# Patient Record
Sex: MALE | Race: WHITE | NOT HISPANIC OR LATINO | Employment: OTHER | URBAN - NONMETROPOLITAN AREA
[De-identification: names, ages, dates, MRNs, and addresses within clinical notes are randomized per-mention and may not be internally consistent; named-entity substitution may affect disease eponyms.]

---

## 2017-05-22 ENCOUNTER — OFFICE VISIT (OUTPATIENT)
Dept: CARDIOLOGY | Facility: CLINIC | Age: 67
End: 2017-05-22

## 2017-05-22 VITALS
DIASTOLIC BLOOD PRESSURE: 88 MMHG | BODY MASS INDEX: 26.51 KG/M2 | HEIGHT: 69 IN | HEART RATE: 60 BPM | SYSTOLIC BLOOD PRESSURE: 136 MMHG | WEIGHT: 179 LBS

## 2017-05-22 DIAGNOSIS — I10 ESSENTIAL HYPERTENSION: ICD-10-CM

## 2017-05-22 DIAGNOSIS — R00.2 PALPITATIONS: ICD-10-CM

## 2017-05-22 DIAGNOSIS — E78.2 MIXED HYPERLIPIDEMIA: ICD-10-CM

## 2017-05-22 DIAGNOSIS — I25.10 CORONARY ARTERY DISEASE INVOLVING NATIVE CORONARY ARTERY OF NATIVE HEART WITHOUT ANGINA PECTORIS: Primary | ICD-10-CM

## 2017-05-22 PROCEDURE — 99214 OFFICE O/P EST MOD 30 MIN: CPT | Performed by: INTERNAL MEDICINE

## 2017-05-22 RX ORDER — NITROGLYCERIN 400 UG/1
1 SPRAY ORAL
COMMUNITY

## 2017-05-22 RX ORDER — LOSARTAN POTASSIUM 50 MG/1
50 TABLET ORAL DAILY
COMMUNITY
End: 2019-05-29

## 2017-05-22 RX ORDER — PANTOPRAZOLE SODIUM 40 MG/1
40 TABLET, DELAYED RELEASE ORAL DAILY
COMMUNITY
End: 2019-05-29

## 2017-05-22 RX ORDER — CARVEDILOL 6.25 MG/1
6.25 TABLET ORAL 2 TIMES DAILY WITH MEALS
COMMUNITY
End: 2019-05-29

## 2017-05-22 RX ORDER — FERROUS SULFATE 325(65) MG
325 TABLET ORAL
COMMUNITY
End: 2019-05-29

## 2017-05-22 RX ORDER — LOPERAMIDE HYDROCHLORIDE 2 MG/1
2 CAPSULE ORAL AS NEEDED
COMMUNITY
End: 2019-05-29

## 2017-05-22 RX ORDER — ASPIRIN 325 MG
325 TABLET ORAL DAILY
COMMUNITY
End: 2019-05-29

## 2017-05-22 RX ORDER — SIMVASTATIN 40 MG
40 TABLET ORAL NIGHTLY
COMMUNITY
End: 2019-05-29

## 2018-05-23 NOTE — PROGRESS NOTES
[facility]  Office Progress Note  Tyree Mcneil  1950  [phone]  [phone]    Visit Date: 5/24/2018     PCP: Jr Altamirano MD  [address] [facility] [address]    IDENTIFICATION: A 67 y.o. male retired, from [place], [place].    Chief Complaint   Patient presents with   • Coronary Artery Disease       PROBLEM LIST:   1. Coronary artery disease:  a. March 2004, GLO to LAD.  b. December 2005, multivessel coronary disease with CABG x4 per Sachin Camarena MD; LIMA to LAD; saphenous graft to sequential OM1 and posterolateral RCA; saphenous graft to PDA. Normal LVEF.  c. November 2010, MPS per [facility]: Normal perfusion, EF preserved.   d. Spring 2011, non-ST MI at [place], perioperative during Ileostomy.  e. 5/16/16 echo: EF 55-60%, mild MR, mild TR, RVSP 20 mmHg  2. Pulmonary embolus:  a. Discontinuation of Coumadin one year after initiation in 2012.   3. Hypertension.   4. Dyslipidemia:   a. May 2015: Total 118, triglyceride 81, HDL 39, LDL 63.   5. Reformed nicotine addiction.   6. Anxiety.   7. Syncope.   8. Gastroesophageal reflux disease/history of hiatal hernia.  9. Right inguinal hernia.  10. Lower extremity neuropathy following chemotherapy.   11. Acute renal failure.  12. Sepsis.  13. A 50-pound weight loss.  14. Colorectal cancer, patient received chemo and radiation:  a. Colon resection, December 2010.   b. January 2011, reversal of ileostomy with notation of extensive adhesions.   c. Patient refuses further colorectal evaluation at time of ileostomy.     Allergies  Allergies   Allergen Reactions   • Morphine And Related Anaphylaxis   • Other      Ace inhibitors         Current Medications    Current Outpatient Prescriptions:   •  aspirin 325 MG tablet, Take 325 mg by mouth Daily., Disp: , Rfl:   •  carvedilol (COREG) 6.25 MG tablet, Take 6.25 mg by mouth 2 (Two) Times a Day With Meals., Disp: , Rfl:   •  Cyanocobalamin (VITAMIN B 12 PO), Take 1,000 mcg by  "mouth., Disp: , Rfl:   •  ferrous sulfate 325 (65 FE) MG tablet, Take 325 mg by mouth Daily With Breakfast., Disp: , Rfl:   •  loperamide (IMODIUM) 2 MG capsule, Take 2 mg by mouth As Needed for Diarrhea., Disp: , Rfl:   •  losartan (COZAAR) 50 MG tablet, Take 50 mg by mouth Daily., Disp: , Rfl:   •  nitroglycerin (NITROLINGUAL) 0.4 MG/SPRAY spray, Place 1 spray under the tongue Every 5 (Five) Minutes As Needed for Chest Pain., Disp: , Rfl:   •  pantoprazole (PROTONIX) 40 MG EC tablet, Take 40 mg by mouth Daily., Disp: , Rfl:   •  simvastatin (ZOCOR) 40 MG tablet, Take 40 mg by mouth Every Night., Disp: , Rfl:       History of Present Illness     Pt denies any chest pain, dyspnea, dyspnea on exertion, orthopnea, PND,   lower extremity edema, or claudication.  Stopped all medications over the last year w melena.  Does not want to fu w GI.  Labs earlier this month essentially normal w LDL > 100 however.    ROS:  All systems have been reviewed and are negative with the exception of those mentioned in the HPI.    OBJECTIVE:  Vitals:    05/24/18 0928   BP: 120/89   BP Location: Right arm   Patient Position: Sitting   Pulse: 54   Weight: 79.8 kg (176 lb)   Height: 175.3 cm (69\")     Physical Exam   Constitutional: He appears well-developed and well-nourished.   Neck: Normal range of motion. Neck supple. No hepatojugular reflux and no JVD present. Carotid bruit is not present. No tracheal deviation present. No thyromegaly present.   Cardiovascular: Normal rate, regular rhythm, S1 normal, S2 normal, intact distal pulses and normal pulses.   Extrasystoles are present. PMI is not displaced.  Exam reveals no gallop, no distant heart sounds, no friction rub, no midsystolic click and no opening snap.    No murmur heard.  Pulses:       Radial pulses are 2+ on the right side, and 2+ on the left side.        Dorsalis pedis pulses are 2+ on the right side, and 2+ on the left side.        Posterior tibial pulses are 2+ on the right " side, and 2+ on the left side.   Pulmonary/Chest: Effort normal and breath sounds normal. He has no wheezes. He has no rales.   Abdominal: Soft. Bowel sounds are normal. He exhibits no mass. There is no tenderness. There is no guarding.       Diagnostic Data:  Procedures      ASSESSMENT:  No diagnosis found.    PLAN:  1. Coronary artery disease with remote arterial bypass grafting with no new anginal equivalents: Restart minimal Rx if pt agrees asa 81, coreg 3.2125 bid, simva 20  2. Hypertension: Patient does not monitor his blood pressures at home and did not take his blood pressure medications today; therefore, his blood pressure is not elevated in the office today.  3.  Dyslipidemia: restart simva  4. Discussed at length w pt and daughter , he should fu w GI.  He defers    Jr Altamirano MD, thank you for referring Mr. Mcneil for evaluation.  I have forwarded my electronically generated recommendations to you for review.  Please do not hesitate to call with any questions.    Scribed for Reggie Garcia MD by Gini Santacruz PA-C. 5/24/2018  9:56 AM  I, Reggie Garcia MD, personally performed the services described in this documentation as scribed by the above named individual in my presence, and it is both accurate and complete.  5/24/2018  9:59 AM    Reggie Garcia MD, [facility]

## 2018-05-24 ENCOUNTER — OFFICE VISIT (OUTPATIENT)
Dept: CARDIOLOGY | Facility: CLINIC | Age: 68
End: 2018-05-24

## 2018-05-24 VITALS
SYSTOLIC BLOOD PRESSURE: 120 MMHG | HEART RATE: 54 BPM | DIASTOLIC BLOOD PRESSURE: 89 MMHG | WEIGHT: 176 LBS | HEIGHT: 69 IN | BODY MASS INDEX: 26.07 KG/M2

## 2018-05-24 DIAGNOSIS — E78.2 MIXED HYPERLIPIDEMIA: ICD-10-CM

## 2018-05-24 DIAGNOSIS — I25.10 CORONARY ARTERY DISEASE INVOLVING NATIVE CORONARY ARTERY OF NATIVE HEART WITHOUT ANGINA PECTORIS: Primary | ICD-10-CM

## 2018-05-24 DIAGNOSIS — I10 ESSENTIAL HYPERTENSION: ICD-10-CM

## 2018-05-24 PROCEDURE — 99214 OFFICE O/P EST MOD 30 MIN: CPT | Performed by: INTERNAL MEDICINE

## 2019-05-07 ENCOUNTER — TRANSCRIBE ORDERS (OUTPATIENT)
Dept: ADMINISTRATIVE | Facility: HOSPITAL | Age: 69
End: 2019-05-07

## 2019-05-07 DIAGNOSIS — I65.23 BILATERAL CAROTID ARTERY STENOSIS: Primary | ICD-10-CM

## 2019-05-29 ENCOUNTER — OFFICE VISIT (OUTPATIENT)
Dept: CARDIOLOGY | Facility: CLINIC | Age: 69
End: 2019-05-29

## 2019-05-29 VITALS
BODY MASS INDEX: 24.65 KG/M2 | WEIGHT: 166.4 LBS | SYSTOLIC BLOOD PRESSURE: 140 MMHG | HEIGHT: 69 IN | HEART RATE: 64 BPM | DIASTOLIC BLOOD PRESSURE: 80 MMHG

## 2019-05-29 DIAGNOSIS — E78.2 MIXED HYPERLIPIDEMIA: ICD-10-CM

## 2019-05-29 DIAGNOSIS — I10 ESSENTIAL HYPERTENSION: ICD-10-CM

## 2019-05-29 DIAGNOSIS — I25.10 CORONARY ARTERY DISEASE INVOLVING NATIVE CORONARY ARTERY OF NATIVE HEART WITHOUT ANGINA PECTORIS: Primary | ICD-10-CM

## 2019-05-29 PROCEDURE — 99214 OFFICE O/P EST MOD 30 MIN: CPT | Performed by: INTERNAL MEDICINE

## 2019-05-29 NOTE — PROGRESS NOTES
[facility]  Office Progress Note  Tyree Mcneil  1950  [phone]  [phone]    Visit Date: 5/29/2019     PCP: Jr Altamirano MD  [address] [facility] [address]    IDENTIFICATION: A 68 y.o. male retired, from [place], [place].    Chief Complaint   Patient presents with   • Coronary Artery Disease       PROBLEM LIST:   1. Coronary artery disease:  a. March 2004, GLO to LAD.  b. December 2005, multivessel coronary disease with CABG x4 per Sachin Camarena MD; LIMA to LAD; saphenous graft to sequential OM1 and posterolateral RCA; saphenous graft to PDA. Normal LVEF.  c. November 2010, MPS per [facility]: Normal perfusion, EF preserved.   d. Spring 2011, non-ST MI at [place], perioperative during Ileostomy.  e. 5/16/16 echo: EF 55-60%, mild MR, mild TR, RVSP 20 mmHg  2. Pulmonary embolus:  a. Discontinuation of Coumadin one year after initiation in 2012.   3. Hypertension.   4. Dyslipidemia:   a. May 2015: Total 118, triglyceride 81, HDL 39, LDL 63.   b. 5/8/19:  TG 86 HDL 36   5. Reformed nicotine addiction.   6. Anxiety.   7. Syncope.   8. Gastroesophageal reflux disease/history of hiatal hernia.  9. Right inguinal hernia.  10. Lower extremity neuropathy following chemotherapy.   11. Acute renal failure.  12. Sepsis.  13. A 50-pound weight loss.  14. Colorectal cancer, patient received chemo and radiation:  a. Colon resection, December 2010.   b. January 2011, reversal of ileostomy with notation of extensive adhesions.   c. Patient refuses further colorectal evaluation at time of ileostomy.     Allergies  Allergies   Allergen Reactions   • Morphine And Related Anaphylaxis   • Other      Ace inhibitors         Current Medications    Current Outpatient Medications:   •  nitroglycerin (NITROLINGUAL) 0.4 MG/SPRAY spray, Place 1 spray under the tongue Every 5 (Five) Minutes As Needed for Chest Pain., Disp: , Rfl:       History of Present Illness     Pt denies any  "chest pain, dyspnea, dyspnea on exertion, orthopnea, PND,   lower extremity edema, or claudication.  Stopped all medications again  over the last year w recurrent melena after round of antibiotics for tooth extraction.  Does not want to fu w GI.        OBJECTIVE:  Vitals:    05/29/19 0857   BP: 140/80   BP Location: Right arm   Patient Position: Sitting   Pulse: 64   Weight: 75.5 kg (166 lb 6.4 oz)   Height: 175.3 cm (69\")     Physical Exam   Constitutional: He appears well-developed and well-nourished.   Neck: Normal range of motion. Neck supple. No hepatojugular reflux and no JVD present. Carotid bruit is not present. No tracheal deviation present. No thyromegaly present.   Cardiovascular: Normal rate, regular rhythm, S1 normal, S2 normal, intact distal pulses and normal pulses.  Extrasystoles are present. PMI is not displaced. Exam reveals no gallop, no distant heart sounds, no friction rub, no midsystolic click and no opening snap.   No murmur heard.  Pulses:       Radial pulses are 2+ on the right side, and 2+ on the left side.        Dorsalis pedis pulses are 2+ on the right side, and 2+ on the left side.        Posterior tibial pulses are 2+ on the right side, and 2+ on the left side.   Pulmonary/Chest: Effort normal and breath sounds normal. He has no wheezes. He has no rales.   Abdominal: Soft. Bowel sounds are normal. He exhibits no mass. There is no tenderness. There is no guarding.       Diagnostic Data:  Procedures      ASSESSMENT:   Diagnosis Plan   1. Coronary artery disease involving native coronary artery of native heart without angina pectoris     2. Essential hypertension     3. Mixed hyperlipidemia         PLAN:  1. Coronary artery disease with remote arterial bypass grafting with no new anginal equivalents: Pt defers Rx.  2. Hypertension: Patient does not monitor his blood pressures at home and does not take his blood pressure medications   3.  Dyslipidemia: differs statin    Not sure what role " I can offer at current as pt does not wish to continue any preventative treatments.      Jr Altamirano MD, thank you for referring Mr. Mcneil for evaluation.  I have forwarded my electronically generated recommendations to you for review.  Please do not hesitate to call with any questions.     Reggie Garcia MD, FACC

## 2019-08-08 ENCOUNTER — TRANSCRIBE ORDERS (OUTPATIENT)
Dept: ADMINISTRATIVE | Facility: HOSPITAL | Age: 69
End: 2019-08-08

## 2019-08-08 ENCOUNTER — HOSPITAL ENCOUNTER (OUTPATIENT)
Dept: GENERAL RADIOLOGY | Facility: HOSPITAL | Age: 69
Discharge: HOME | End: 2019-08-08
Admitting: INTERNAL MEDICINE

## 2019-08-08 DIAGNOSIS — R07.81 PLEURITIC CHEST PAIN: Primary | ICD-10-CM

## 2019-08-08 DIAGNOSIS — R07.81 PLEURITIC CHEST PAIN: ICD-10-CM

## 2019-08-08 PROCEDURE — 71046 X-RAY EXAM CHEST 2 VIEWS: CPT

## 2019-08-08 PROCEDURE — 71046 X-RAY EXAM CHEST 2 VIEWS: CPT | Performed by: RADIOLOGY

## 2020-08-24 ENCOUNTER — OFFICE VISIT (OUTPATIENT)
Dept: CARDIOLOGY | Facility: CLINIC | Age: 70
End: 2020-08-24

## 2020-08-24 VITALS
OXYGEN SATURATION: 97 % | SYSTOLIC BLOOD PRESSURE: 122 MMHG | DIASTOLIC BLOOD PRESSURE: 74 MMHG | HEART RATE: 52 BPM | WEIGHT: 155 LBS | BODY MASS INDEX: 22.96 KG/M2 | HEIGHT: 69 IN

## 2020-08-24 DIAGNOSIS — E78.2 MIXED HYPERLIPIDEMIA: ICD-10-CM

## 2020-08-24 DIAGNOSIS — I25.10 CORONARY ARTERY DISEASE INVOLVING NATIVE CORONARY ARTERY OF NATIVE HEART WITHOUT ANGINA PECTORIS: Primary | ICD-10-CM

## 2020-08-24 DIAGNOSIS — I10 ESSENTIAL HYPERTENSION: ICD-10-CM

## 2020-08-24 PROCEDURE — 99213 OFFICE O/P EST LOW 20 MIN: CPT | Performed by: INTERNAL MEDICINE

## 2020-08-24 PROCEDURE — 93000 ELECTROCARDIOGRAM COMPLETE: CPT | Performed by: INTERNAL MEDICINE

## 2020-08-24 NOTE — PROGRESS NOTES
[facility]  Office Progress Note  Tyree Mcneil  1950  [phone]  [phone]    Visit Date: 8/24/2020     PCP: Jr Altamirano MD  [address] [facility] [address]    IDENTIFICATION: A 69 y.o. male retired, from [place], [place].    Chief Complaint   Patient presents with   • Coronary Artery Disease       PROBLEM LIST:   1. Coronary artery disease:  a. March 2004, GLO to LAD.  b. December 2005, multivessel coronary disease with CABG x4 per Sachin Camarena MD; LIMA to LAD; saphenous graft to sequential OM1 and posterolateral RCA; saphenous graft to PDA. Normal LVEF.  c. November 2010, MPS per [facility]: Normal perfusion, EF preserved.   d. Spring 2011, non-ST MI at [place], perioperative during Ileostomy.  e. 5/16/16 echo: EF 55-60%, mild MR, mild TR, RVSP 20 mmHg  2. Pulmonary embolus:  a. Discontinuation of Coumadin one year after initiation in 2012.   3. Hypertension.   4. Dyslipidemia:   a. May 2015: Total 118, triglyceride 81, HDL 39, LDL 63.   b. 5/8/19:  TG 86 HDL 36   5. Reformed nicotine addiction.   6. Anxiety.   7. Syncope.   8. Gastroesophageal reflux disease/history of hiatal hernia.  9. Right inguinal hernia.  10. Lower extremity neuropathy following chemotherapy.   11. Acute renal failure.  12. Sepsis.  13. A 50-pound weight loss.  14. Colorectal cancer, patient received chemo and radiation:  a. Colon resection, December 2010.   b. January 2011, reversal of ileostomy with notation of extensive adhesions.   c. Patient refuses further colorectal evaluation at time of ileostomy.     Allergies  Allergies   Allergen Reactions   • Morphine And Related Anaphylaxis   • Other      Ace inhibitors         Current Medications    Current Outpatient Medications:   •  nitroglycerin (NITROLINGUAL) 0.4 MG/SPRAY spray, Place 1 spray under the tongue Every 5 (Five) Minutes As Needed for Chest Pain., Disp: , Rfl:       History of Present Illness     Pt denies any  "chest pain, dyspnea, dyspnea on exertion, orthopnea, PND,   lower extremity edema, or claudication.  No recent serologies continues yearly follow-up with Dr. Altamirano.  He has had decreased activities this summer due to inclement heat.      OBJECTIVE:  Vitals:    08/24/20 1247   BP: 122/74   BP Location: Right arm   Patient Position: Sitting   Pulse: 52   SpO2: 97%   Weight: 70.3 kg (155 lb)   Height: 175.3 cm (69\")     Physical Exam   Constitutional: He appears well-developed and well-nourished.   Neck: Normal range of motion. Neck supple. No hepatojugular reflux and no JVD present. Carotid bruit is not present. No tracheal deviation present. No thyromegaly present.   Cardiovascular: Normal rate, regular rhythm, S1 normal, S2 normal, intact distal pulses and normal pulses.  Extrasystoles are present. PMI is not displaced. Exam reveals no gallop, no distant heart sounds, no friction rub, no midsystolic click and no opening snap.   No murmur heard.  Pulses:       Radial pulses are 2+ on the right side, and 2+ on the left side.        Dorsalis pedis pulses are 2+ on the right side, and 2+ on the left side.        Posterior tibial pulses are 2+ on the right side, and 2+ on the left side.   Pulmonary/Chest: Effort normal and breath sounds normal. He has no wheezes. He has no rales.   Abdominal: Soft. Bowel sounds are normal. He exhibits no mass. There is no tenderness. There is no guarding.       Diagnostic Data:    ECG 12 Lead  Date/Time: 8/24/2020 1:54 PM  Performed by: Reggie Garcia MD  Authorized by: Reggie Garcia MD   Previous ECG: no previous ECG available  Rhythm: sinus bradycardia  BPM: 52    Clinical impression: non-specific ECG              ASSESSMENT:   Diagnosis Plan   1. Coronary artery disease involving native coronary artery of native heart without angina pectoris     2. Essential hypertension     3. Mixed hyperlipidemia         PLAN:  1. Coronary artery disease with remote arterial bypass grafting " with no new anginal equivalents: Pt defers Rx.  2. Hypertension: Patient does not monitor his blood pressures at home and does not take his blood pressure medications   3.  Dyslipidemia: differs statin    Not sure what role I can offer at current as pt does not wish to continue any preventative treatments.      Jr Altamirano MD, thank you for referring Mr. Mcneil for evaluation.  I have forwarded my electronically generated recommendations to you for review.  Please do not hesitate to call with any questions.     Reggie Garcia MD, FACC

## 2021-02-17 DIAGNOSIS — Z23 IMMUNIZATION DUE: ICD-10-CM

## 2021-08-30 ENCOUNTER — OFFICE VISIT (OUTPATIENT)
Dept: CARDIOLOGY | Facility: CLINIC | Age: 71
End: 2021-08-30

## 2021-08-30 VITALS
HEART RATE: 62 BPM | BODY MASS INDEX: 24.64 KG/M2 | WEIGHT: 157 LBS | SYSTOLIC BLOOD PRESSURE: 130 MMHG | OXYGEN SATURATION: 98 % | DIASTOLIC BLOOD PRESSURE: 74 MMHG | HEIGHT: 67 IN

## 2021-08-30 DIAGNOSIS — E78.2 MIXED HYPERLIPIDEMIA: ICD-10-CM

## 2021-08-30 DIAGNOSIS — I25.10 CORONARY ARTERY DISEASE INVOLVING NATIVE CORONARY ARTERY OF NATIVE HEART WITHOUT ANGINA PECTORIS: Primary | ICD-10-CM

## 2021-08-30 PROCEDURE — 99213 OFFICE O/P EST LOW 20 MIN: CPT | Performed by: INTERNAL MEDICINE

## 2021-08-30 PROCEDURE — 93000 ELECTROCARDIOGRAM COMPLETE: CPT | Performed by: INTERNAL MEDICINE

## 2021-08-30 NOTE — PROGRESS NOTES
[facility]  Office Progress Note  Tyree Mcneil  1950  [address] [place] [address]       Visit Date: 08/30/21    PCP: Jr Altamirano MD  [address] [facility] [address]    IDENTIFICATION: A 71 y.o. male retired, from [place], [place].    PROBLEM LIST:   1. Coronary artery disease:  a. March 2004, GLO to LAD.  b. December 2005, multivessel coronary disease with CABG x4 per Sachin Camarena MD; LIMA to LAD; saphenous graft to sequential OM1 and posterolateral RCA; saphenous graft to PDA. Normal LVEF.  c. November 2010, MPS per [facility]: Normal perfusion, EF preserved.   d. Spring 2011, non-ST MI at [place], perioperative during Ileostomy.  e. 5/16/16 echo: EF 55-60%, mild MR, mild TR, RVSP 20 mmHg  2. Pulmonary embolus:  a. Discontinuation of Coumadin one year after initiation in 2012.   3. Hypertension.   4. Dyslipidemia:   a. May 2015: Total 118, triglyceride 81, HDL 39, LDL 63.   b. 5/8/19:  TG 86 HDL 36   5. Reformed nicotine addiction.   6. Anxiety.   7. Syncope.   8. Gastroesophageal reflux disease/history of hiatal hernia.  9. Right inguinal hernia.  10. Lower extremity neuropathy following chemotherapy.   11. Acute renal failure.  12. Sepsis.  13. A 50-pound weight loss.  14. Colorectal cancer, patient received chemo and radiation:  a. Colon resection, December 2010.   b. January 2011, reversal of ileostomy with notation of extensive adhesions.   c. Patient refuses further colorectal evaluation at time of ileostomy.      CC:   Chief Complaint   Patient presents with   • Coronary Artery Disease       Allergies  Allergies   Allergen Reactions   • Morphine And Related Anaphylaxis   • Other      Ace inhibitors         Current Medications    Current Outpatient Medications:   •  nitroglycerin (NITROLINGUAL) 0.4 MG/SPRAY spray, Place 1 spray under the tongue Every 5 (Five) Minutes As Needed for Chest Pain., Disp: , Rfl:       History of Present Illness   Tyree Mcneil  "is a 71 y.o. year old male here for follow up.    Pt denies any chest pain, dyspnea, dyspnea on exertion, orthopnea, PND, palpitations, lower extremity edema, or claudication.  Continues off medication per his chart      OBJECTIVE:  Vitals:    08/30/21 1033   BP: 130/74   BP Location: Right arm   Patient Position: Sitting   Pulse: 62   SpO2: 98%   Weight: 71.2 kg (157 lb)   Height: 168.9 cm (66.5\")     Body mass index is 24.96 kg/m².    Constitutional:       Appearance: Healthy appearance. Not in distress.   Neck:      Vascular: No JVR. JVD normal.   Pulmonary:      Effort: Pulmonary effort is normal.      Breath sounds: Normal breath sounds. No wheezing. No rhonchi. No rales.   Chest:      Chest wall: Not tender to palpatation.   Cardiovascular:      PMI at left midclavicular line. Normal rate. Regular rhythm. Normal S1. Normal S2.      Murmurs: There is no murmur.      No gallop. No click. No rub.   Pulses:     Intact distal pulses.   Edema:     Peripheral edema absent.   Abdominal:      General: Bowel sounds are normal.      Palpations: Abdomen is soft.      Tenderness: There is no abdominal tenderness.   Musculoskeletal: Normal range of motion.         General: No tenderness. Skin:     General: Skin is warm and dry.   Neurological:      General: No focal deficit present.      Mental Status: Alert and oriented to person, place and time.         Diagnostic Data:    ECG 12 Lead    Date/Time: 8/30/2021 10:52 AM  Performed by: Reggie Garcia MD  Authorized by: Reggie Garcia MD   Previous ECG: no previous ECG available  Rhythm: sinus bradycardia  BPM: 56    Clinical impression: normal ECG              ASSESSMENT:   Diagnosis Plan   1. Coronary artery disease involving native coronary artery of native heart without angina pectoris     2. Mixed hyperlipidemia         PLAN:  CAD post remote surgical vascularization patient elects no medication    Dyslipidemia unknown current status and again patient elects no " medication          Reggie Garcia MD, [facility]

## 2022-01-19 ENCOUNTER — TELEPHONE (OUTPATIENT)
Dept: CARDIOLOGY | Facility: CLINIC | Age: 72
End: 2022-01-19

## 2022-01-19 DIAGNOSIS — I25.10 CORONARY ARTERY DISEASE INVOLVING NATIVE CORONARY ARTERY OF NATIVE HEART WITHOUT ANGINA PECTORIS: Primary | ICD-10-CM

## 2022-01-19 DIAGNOSIS — Z01.818 PREOPERATIVE CLEARANCE: ICD-10-CM

## 2022-01-19 NOTE — TELEPHONE ENCOUNTER
Pt called needing cc for inguinal hernia repair planned with Aris Peña MD  Last seen by NIK 8/2021, takes no medications. History of perioperative MI, Spring 2011    Wife states pt does not complain of chest pain or shortness of breath. She reports he is very active. She is aware that pt is at high risk due to known heart disease, with history of perioperative MI, and his declination of medical therapy.     Spoke with Dr. Peña's office to obtain surgery info.   Large left inguinal hernia, needs repair. Dr Peña has discussed with pt that DVT prophylaxis will be needed.    Fax [phone]    See in office? Offer stress test?     Per NIK Vyas in [place], wife aware and agreeable.

## 2022-01-31 ENCOUNTER — HOSPITAL ENCOUNTER (OUTPATIENT)
Dept: CARDIOLOGY | Facility: HOSPITAL | Age: 72
Discharge: HOME | End: 2022-01-31
Admitting: INTERNAL MEDICINE

## 2022-01-31 VITALS
BODY MASS INDEX: 25.23 KG/M2 | DIASTOLIC BLOOD PRESSURE: 79 MMHG | SYSTOLIC BLOOD PRESSURE: 143 MMHG | HEART RATE: 60 BPM | WEIGHT: 157 LBS | HEIGHT: 66 IN

## 2022-01-31 DIAGNOSIS — I25.10 CORONARY ARTERY DISEASE INVOLVING NATIVE CORONARY ARTERY OF NATIVE HEART WITHOUT ANGINA PECTORIS: ICD-10-CM

## 2022-01-31 DIAGNOSIS — Z01.818 PREOPERATIVE CLEARANCE: ICD-10-CM

## 2022-01-31 PROCEDURE — A9500 TC99M SESTAMIBI: HCPCS | Performed by: INTERNAL MEDICINE

## 2022-01-31 PROCEDURE — 93017 CV STRESS TEST TRACING ONLY: CPT

## 2022-01-31 PROCEDURE — 78452 HT MUSCLE IMAGE SPECT MULT: CPT

## 2022-01-31 PROCEDURE — 78452 HT MUSCLE IMAGE SPECT MULT: CPT | Performed by: INTERNAL MEDICINE

## 2022-01-31 PROCEDURE — 93018 CV STRESS TEST I&R ONLY: CPT | Performed by: INTERNAL MEDICINE

## 2022-01-31 PROCEDURE — 25010000002 REGADENOSON 0.4 MG/5ML SOLUTION: Performed by: INTERNAL MEDICINE

## 2022-01-31 PROCEDURE — 0 TECHNETIUM SESTAMIBI: Performed by: INTERNAL MEDICINE

## 2022-01-31 RX ADMIN — REGADENOSON 0.4 MG: 0.08 INJECTION, SOLUTION INTRAVENOUS at 09:46

## 2022-01-31 RX ADMIN — TECHNETIUM TC 99M SESTAMIBI 1 DOSE: 1 INJECTION INTRAVENOUS at 09:45

## 2022-01-31 RX ADMIN — TECHNETIUM TC 99M SESTAMIBI 1 DOSE: 1 INJECTION INTRAVENOUS at 08:05

## 2022-02-01 ENCOUNTER — TELEPHONE (OUTPATIENT)
Dept: CARDIOLOGY | Facility: CLINIC | Age: 72
End: 2022-02-01

## 2022-02-01 LAB
BH CV REST NUCLEAR ISOTOPE DOSE: 9.6 MCI
BH CV STRESS BP STAGE 2: NORMAL
BH CV STRESS BP STAGE 4: NORMAL
BH CV STRESS COMMENTS STAGE 1: NORMAL
BH CV STRESS DOSE REGADENOSON STAGE 1: 0.4
BH CV STRESS DURATION MIN STAGE 1: 1
BH CV STRESS DURATION MIN STAGE 2: 1
BH CV STRESS DURATION MIN STAGE 3: 1
BH CV STRESS DURATION MIN STAGE 4: 1
BH CV STRESS HR STAGE 1: 75
BH CV STRESS HR STAGE 2: 109
BH CV STRESS HR STAGE 3: 87
BH CV STRESS HR STAGE 4: 85
BH CV STRESS NUCLEAR ISOTOPE DOSE: 33 MCI
BH CV STRESS O2 STAGE 1: 91
BH CV STRESS O2 STAGE 2: 96
BH CV STRESS O2 STAGE 3: 97
BH CV STRESS O2 STAGE 4: 97
BH CV STRESS PROTOCOL 1: NORMAL
BH CV STRESS RECOVERY BP: NORMAL MMHG
BH CV STRESS RECOVERY HR: 82 BPM
BH CV STRESS RECOVERY O2: 97 %
BH CV STRESS STAGE 1: 1
BH CV STRESS STAGE 2: 2
BH CV STRESS STAGE 3: 3
BH CV STRESS STAGE 4: 4
LV EF NUC BP: 62 %
MAXIMAL PREDICTED HEART RATE: 149 BPM
PERCENT MAX PREDICTED HR: 75.17 %
STRESS BASELINE BP: NORMAL MMHG
STRESS BASELINE HR: 60 BPM
STRESS O2 SAT REST: 91 %
STRESS PERCENT HR: 88 %
STRESS POST ESTIMATED WORKLOAD: 1 METS
STRESS POST EXERCISE DUR MIN: 4 MIN
STRESS POST EXERCISE DUR SEC: 0 SEC
STRESS POST O2 SAT PEAK: 97 %
STRESS POST PEAK BP: NORMAL MMHG
STRESS POST PEAK HR: 112 BPM
STRESS TARGET HR: 127 BPM

## 2022-02-01 NOTE — TELEPHONE ENCOUNTER
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 ePña.

## 2022-03-07 ENCOUNTER — PRE-ADMISSION TESTING (OUTPATIENT)
Dept: PREADMISSION TESTING | Facility: HOSPITAL | Age: 72
End: 2022-03-07

## 2022-03-07 LAB
ANION GAP SERPL CALCULATED.3IONS-SCNC: 8 MMOL/L (ref 5–15)
BUN SERPL-MCNC: 21 MG/DL (ref 8–23)
BUN/CREAT SERPL: 19.8 (ref 7–25)
CALCIUM SPEC-SCNC: 9.6 MG/DL (ref 8.6–10.5)
CHLORIDE SERPL-SCNC: 106 MMOL/L (ref 98–107)
CO2 SERPL-SCNC: 27 MMOL/L (ref 22–29)
CREAT SERPL-MCNC: 1.06 MG/DL (ref 0.76–1.27)
DEPRECATED RDW RBC AUTO: 39.2 FL (ref 37–54)
EGFRCR SERPLBLD CKD-EPI 2021: 75 ML/MIN/1.73
ERYTHROCYTE [DISTWIDTH] IN BLOOD BY AUTOMATED COUNT: 12.3 % (ref 12.3–15.4)
GLUCOSE SERPL-MCNC: 93 MG/DL (ref 65–99)
HCT VFR BLD AUTO: 39.5 % (ref 37.5–51)
HGB BLD-MCNC: 13.2 G/DL (ref 13–17.7)
MCH RBC QN AUTO: 29.3 PG (ref 26.6–33)
MCHC RBC AUTO-ENTMCNC: 33.4 G/DL (ref 31.5–35.7)
MCV RBC AUTO: 87.8 FL (ref 79–97)
PLATELET # BLD AUTO: 193 10*3/MM3 (ref 140–450)
PMV BLD AUTO: 10.5 FL (ref 6–12)
POTASSIUM SERPL-SCNC: 4.3 MMOL/L (ref 3.5–5.2)
QT INTERVAL: 434 MS
QTC INTERVAL: 400 MS
RBC # BLD AUTO: 4.5 10*6/MM3 (ref 4.14–5.8)
SARS-COV-2 RNA PNL SPEC NAA+PROBE: NOT DETECTED
SODIUM SERPL-SCNC: 141 MMOL/L (ref 136–145)
WBC NRBC COR # BLD: 5.96 10*3/MM3 (ref 3.4–10.8)

## 2022-03-07 PROCEDURE — C9803 HOPD COVID-19 SPEC COLLECT: HCPCS

## 2022-03-07 PROCEDURE — 93010 ELECTROCARDIOGRAM REPORT: CPT | Performed by: INTERNAL MEDICINE

## 2022-03-07 PROCEDURE — 80048 BASIC METABOLIC PNL TOTAL CA: CPT

## 2022-03-07 PROCEDURE — 93005 ELECTROCARDIOGRAM TRACING: CPT

## 2022-03-07 PROCEDURE — 85027 COMPLETE CBC AUTOMATED: CPT

## 2022-03-07 PROCEDURE — 36415 COLL VENOUS BLD VENIPUNCTURE: CPT

## 2022-03-07 PROCEDURE — U0005 INFEC AGEN DETEC AMPLI PROBE: HCPCS

## 2022-03-07 PROCEDURE — U0004 COV-19 TEST NON-CDC HGH THRU: HCPCS

## 2022-03-07 NOTE — DISCHARGE INSTRUCTIONS
The following information and instructions were given:    Nothing to eat or drink after midnight except sips of water with routine prescribed medication (except blood thinner, certain blood pressure medications, diabetes, or weight reducing medication) unless otherwise instructed by your physician.  Do not eat, drink, smoke or chew gum after midnight the night before surgery. This also includes no mints.    EXCEPTION: ERAS patients Patient instructed to drink 20 ounces (or until full) of Gatorade and it needs to be completed 1 hour before given arrival time on the day of surgery. (NO RED Gatorade)    Patient verbalized understanding.      DO NOT shave for two days before your procedure.  Do not wear makeup.      DO NOT wear fingernail polish (gel/regular) and/or acrylic/artificial nails on the day of surgery.   If a patient had recent manicure and would rather not remove polish or artificial nails, then the minimum requirement is that the polish/artificial nails must be removed from the middle finger on each hand.      If patient was having surgery on an upper extremity, then the patient was instructed that fingernail polish/artificial fingernails must be removed for surgery.  NO EXCEPTIONS.      If patient was having surgery on a lower extremity, then the patient was instructed that toenail polish on both extremities must be removed for surgery.  NO EXCEPTIONS.    Remove all jewelry (advised to go to jeweler if unable to remove).  Jewelry especially rings can no longer be taped for surgery.    Leave anything you consider valuable at home.      Bring the following with you (if applicable)   -picture ID and insurance cards   -Co-pay/deductible required by insurance   -Medications in the original bottles (not a list) including all over-the-counter meds     Education booklet, brochure, and/or given to patient.    Patient must have a  for transportation home after procedure.  It must be an   adult that will take  responsibility for care for 24 hours after surgery.

## 2022-03-07 NOTE — PAT
An arrival time for procedure was not given during PAT visit. If patient had any questions or concerns about their arrival time, they were instructed to contact their surgeon/physician.  Additionally, if the patient referred to an arrival time that was acquired from their my chart account, patient was encouraged to verify that time with their surgeon/physician.  NO arrival times given in Pre Admission Testing Department.    COVID test and EKG performed in PAT.

## 2022-08-08 ENCOUNTER — HOSPITAL ENCOUNTER (OUTPATIENT)
Dept: ULTRASOUND IMAGING | Facility: HOSPITAL | Age: 72
Discharge: HOME | End: 2022-08-08
Admitting: UROLOGY

## 2022-08-08 ENCOUNTER — OFFICE VISIT (OUTPATIENT)
Dept: UROLOGY | Facility: CLINIC | Age: 72
End: 2022-08-08

## 2022-08-08 VITALS — HEIGHT: 66 IN | WEIGHT: 155 LBS | BODY MASS INDEX: 24.91 KG/M2

## 2022-08-08 DIAGNOSIS — R35.0 FREQUENCY OF URINATION: Primary | ICD-10-CM

## 2022-08-08 DIAGNOSIS — N50.89 SCROTAL MASS: ICD-10-CM

## 2022-08-08 LAB
BILIRUB BLD-MCNC: ABNORMAL MG/DL
CLARITY, POC: CLEAR
COLOR UR: YELLOW
EXPIRATION DATE: ABNORMAL
GLUCOSE UR STRIP-MCNC: NEGATIVE MG/DL
KETONES UR QL: NEGATIVE
LEUKOCYTE EST, POC: NEGATIVE
Lab: ABNORMAL
NITRITE UR-MCNC: NEGATIVE MG/ML
PH UR: 6 [PH] (ref 5–8)
PROT UR STRIP-MCNC: NEGATIVE MG/DL
RBC # UR STRIP: NEGATIVE /UL
SP GR UR: 1.01 (ref 1–1.03)
UROBILINOGEN UR QL: NORMAL

## 2022-08-08 PROCEDURE — 76870 US EXAM SCROTUM: CPT | Performed by: RADIOLOGY

## 2022-08-08 PROCEDURE — 76870 US EXAM SCROTUM: CPT

## 2022-08-08 PROCEDURE — 81003 URINALYSIS AUTO W/O SCOPE: CPT | Performed by: UROLOGY

## 2022-08-08 PROCEDURE — 99204 OFFICE O/P NEW MOD 45 MIN: CPT | Performed by: UROLOGY

## 2022-08-08 RX ORDER — CYANOCOBALAMIN 1000 UG/ML
INJECTION, SOLUTION INTRAMUSCULAR; SUBCUTANEOUS
COMMUNITY
Start: 2021-11-11

## 2022-08-08 NOTE — PROGRESS NOTES
Office Visit New Urology      Patient Name: Tyree Mcneil  : 1950   MRN: [id number]     Chief Complaint:    Chief Complaint   Patient presents with   • Hydrocele        Referring Provider: Jr Altamirano MD    History of Present Illness: Tyree Mcneil is a 71 y.o. male who presents to Urology today for left hydrocele.  He underwent bilateral hernia surgery 3 months ago at [facility].  He reports he has had this for some time.  He had a large hernia on that side that was fixed.  He reports the surgeon told him he drained almost a liter of fluid from his left scrotum.  He had is hernia repair with Dr. Akers in [place].  However, there are no notes in our system.    He reports he has healed well from his surgery.  He reports no pain or discomfort associated with it.    No family history of  malignancy.     PSA  was 0.7    Subjective      Review of System: Review of Systems   Constitutional: Negative for chills, fatigue, fever and unexpected weight change.   HENT: Negative for congestion, rhinorrhea and sore throat.    Eyes: Negative for visual disturbance.   Respiratory: Negative for apnea, cough, chest tightness, shortness of breath and wheezing.    Cardiovascular: Negative for chest pain.   Gastrointestinal: Negative for abdominal pain, constipation, diarrhea, nausea and vomiting.   Endocrine: Negative for polydipsia and polyuria.   Genitourinary: Positive for scrotal swelling. Negative for difficulty urinating, dysuria, enuresis, flank pain, frequency, genital sores, hematuria and urgency.   Musculoskeletal: Negative for back pain, gait problem and joint swelling.   Skin: Negative for rash and wound.   Allergic/Immunologic: Negative for immunocompromised state.   Neurological: Negative for dizziness, tremors, syncope, weakness, light-headedness and headaches.   Hematological: Does not bruise/bleed easily.   Psychiatric/Behavioral: Negative for confusion.      I have reviewed the ROS  documented by my clinical staff, I have updated appropriately and I agree. Lisa Cuba MD    Past Medical History:   Past Medical History:   Diagnosis Date   • Acute renal failure (HCC)    • Anxiety    • Colorectal cancer (HCC)    • Coronary artery disease    • Dyslipidemia    • GERD (gastroesophageal reflux disease)     history of hiatal hernia    • Hypertension    • Inguinal hernia     right    • Lower extremity neuropathy    • Pulmonary embolus (HCC)    • Sepsis (HCC)    • Syncope    • Weight loss     50 lb        Past Surgical History: History reviewed. No pertinent surgical history.    Family History:   Family History   Problem Relation Age of Onset   • No Known Problems Mother    • Aortic aneurysm Father        Social History:   Social History     Socioeconomic History   • Marital status:    Tobacco Use   • Smoking status: Former Smoker     Types: Cigarettes     Quit date: 2009     Years since quittin.2   • Smokeless tobacco: Former User   Substance and Sexual Activity   • Alcohol use: No   • Drug use: No   • Sexual activity: Defer       Medications:     Current Outpatient Medications:   •  cyanocobalamin 1000 MCG/ML injection, inject 1 milliliter by intramuscular route  every 2 weeks  - dispense with syringes #10, Disp: , Rfl:   •  nitroglycerin (NITROLINGUAL) 0.4 MG/SPRAY spray, Place 1 spray under the tongue Every 5 (Five) Minutes As Needed for Chest Pain., Disp: , Rfl:   •  oxyCODONE-acetaminophen (Percocet) 5-325 MG per tablet, Take 1 tablet by mouth Every 6 (Six) Hours As Needed., Disp: 24 tablet, Rfl: 0    Allergies:   Allergies   Allergen Reactions   • Morphine And Related Anaphylaxis   • Other      Ace inhibitors         IPSS Questionnaire (AUA-7):  Over the past month…    1)  Incomplete Emptying  How often have you had a sensation of not emptying your bladder?  5 - Almost always   2)  Frequency  How often have you had to urinate less than every two hours? 0 - Not at all   3)   "Intermittency  How often have you found you stopped and started again several times when you urinated?  0 - Not at all   4) Urgency  How often have you found it difficult to postpone urination?  5 - Almost always   5) Weak Stream  How often have you had a weak urinary stream?  3 - About half the time   6) Straining  How often have you had to push or strain to begin urination?  0 - Not at all   7) Nocturia  How many times did you typically get up at night to urinate?  1 - 1 time   Total Score:  14   The International Prostate Symptom Score (IPSS) is used to screen, diagnose, track symptoms of benign prostatic hyperplasia (BPH).    0-7 pts (Mild Symptoms)  / 8-19 pts (Moderate) / 20-35 (Severe)    Quality of life due to urinary symptoms:  If you were to spend the rest of your life with your urinary condition the way it is now, how would you feel about that? 0-Delighted and 1-Pleased   Urine Leakage (Incontinence) 0-No Leakage       Objective     Physical Exam:   Vital Signs:   Vitals:    08/08/22 1436   Weight: 70.3 kg (155 lb)   Height: 167.6 cm (66\")     Body mass index is 25.02 kg/m².     Physical Exam  Vitals and nursing note reviewed.   Constitutional:       General: He is awake. He is not in acute distress.     Appearance: Normal appearance. He is well-developed.   HENT:      Head: Normocephalic and atraumatic.      Right Ear: External ear normal.      Left Ear: External ear normal.      Nose: Nose normal.   Eyes:      Conjunctiva/sclera: Conjunctivae normal.   Pulmonary:      Effort: Pulmonary effort is normal.   Abdominal:      General: There is no distension.      Palpations: Abdomen is soft. There is no mass.      Tenderness: There is no abdominal tenderness. There is no right CVA tenderness, left CVA tenderness, guarding or rebound.      Hernia: No hernia is present. There is no hernia in the left inguinal area or right inguinal area.   Genitourinary:     Pubic Area: No rash.       Penis: Normal.       " Rectum: No mass or tenderness. Normal anal tone.      Comments: Left hemiscrotum with 2 distinct areas of fluid collection.  Does not feel like true hydrocele.  There is a hard bridge between the two collections.  Difficult to palpate testicle.    Musculoskeletal:      Cervical back: Normal range of motion.   Lymphadenopathy:      Lower Body: No right inguinal adenopathy. No left inguinal adenopathy.   Skin:     General: Skin is warm.   Neurological:      General: No focal deficit present.      Mental Status: He is alert and oriented to person, place, and time.   Psychiatric:         Behavior: Behavior normal. Behavior is cooperative.         Labs:   Brief Urine Lab Results  (Last result in the past 365 days)      Color   Clarity   Blood   Leuk Est   Nitrite   Protein   CREAT   Urine HCG        08/08/22 1446 Yellow   Clear   Negative   Negative   Negative   Negative                      Lab Results   Component Value Date    GLUCOSE 93 03/07/2022    CALCIUM 9.6 03/07/2022     03/07/2022    K 4.3 03/07/2022    CO2 27.0 03/07/2022     03/07/2022    BUN 21 03/07/2022    CREATININE 1.06 03/07/2022    BCR 19.8 03/07/2022    ANIONGAP 8.0 03/07/2022       Lab Results   Component Value Date    WBC 5.96 03/07/2022    HGB 13.2 03/07/2022    HCT 39.5 03/07/2022    MCV 87.8 03/07/2022     03/07/2022       No results found for: PSA     Images:   No Images in the past 120 days found..    Measures:   Tobacco:   Tyree Mcneil  reports that he quit smoking about 13 years ago. His smoking use included cigarettes. He has quit using smokeless tobacco..      Urine Incontinence: Patient reports that he is not currently experiencing any symptoms of urinary incontinence.       Assessment / Plan      Assessment/Plan:   Tyree Mcneil is a 71 y.o. male who presented today for hydrocele or possible scrotal mass.  This does not feel like a true hydrocele.  I will obtain a scrotal ultrasound.  I will have him follow up in 1  week.  We will need to get records from Dr. Peña if possible.      Diagnoses and all orders for this visit:    1. Frequency of urination (Primary)  -     POC Urinalysis Dipstick, Automated    2. Scrotal mass         Patient Education:        Follow Up:   Return in about 1 week (around 8/15/2022) for Recheck.    I spent approximately 45 minutes providing clinical care for this patient; including review of patient's chart and provider documentation, face to face time spent with patient in examination room (obtaining history, performing physical exam, discussing diagnosis and management options), placing orders, and completing patient documentation.     Lisa Cuba MD  [facility]

## 2022-08-12 ENCOUNTER — TELEPHONE (OUTPATIENT)
Dept: UROLOGY | Facility: CLINIC | Age: 72
End: 2022-08-12

## 2022-08-12 NOTE — TELEPHONE ENCOUNTER
Left message for Dr Hurst Page [facility] office to call back to get medical records on patient. Imaging and OP reports.

## 2022-08-15 ENCOUNTER — OFFICE VISIT (OUTPATIENT)
Dept: UROLOGY | Facility: CLINIC | Age: 72
End: 2022-08-15

## 2022-08-15 VITALS — WEIGHT: 155 LBS | HEIGHT: 66 IN | BODY MASS INDEX: 24.91 KG/M2

## 2022-08-15 DIAGNOSIS — K40.90 LEFT INGUINAL HERNIA: Primary | ICD-10-CM

## 2022-08-15 PROCEDURE — 99214 OFFICE O/P EST MOD 30 MIN: CPT | Performed by: UROLOGY

## 2022-08-15 RX ORDER — ROSUVASTATIN CALCIUM 20 MG/1
1 TABLET, COATED ORAL DAILY
COMMUNITY
Start: 2022-06-24

## 2022-08-15 NOTE — PROGRESS NOTES
Follow Up Office Visit      Patient Name: Tyree Mcneil  : 1950   MRN: [id number]     Chief Complaint:    Chief Complaint   Patient presents with   • Review Ultrasound        Referring Provider: No ref. provider found    History of Present Illness: Tyree Mcneil is a 71 y.o. male who presents today for follow up of He underwent bilateral hernia surgery 3 months ago at [facility].  He reports he has had this for some time.  He had a large hernia on that side that was fixed.  He reports the surgeon told him he drained almost a liter of fluid from his left scrotum.  He had is hernia repair with Dr. Akers in [place].  However, there are no notes in our system.  We have still not received any notes from Dr. Akers.     I reviewed his ultrasound with the radiologist and it is felt like this is most likely a hydrocele.  However, it is difficult to tell based on the images.      Subjective      Review of System: Review of Systems   Constitutional: Negative for chills, fatigue and fever.   Respiratory: Negative for cough, shortness of breath and wheezing.    Cardiovascular: Negative for leg swelling.   Gastrointestinal: Negative for abdominal pain, nausea and vomiting.   Genitourinary: Negative for difficulty urinating and dysuria.   Musculoskeletal: Negative for back pain.   Neurological: Negative for dizziness and headaches.   Psychiatric/Behavioral: Negative for confusion.      I have reviewed the ROS documented by my clinical staff, I have updated appropriately and I agree. Lisa Cuba MD    I have reviewed and the following portions of the patient's history were updated as appropriate: past family history, past medical history, past social history, past surgical history and problem list.    Past Medical History:   Past Medical History:   Diagnosis Date   • Acute renal failure (HCC)    • Anxiety    • Colorectal cancer (HCC)    • Coronary artery disease    • Dyslipidemia    • GERD  (gastroesophageal reflux disease)     history of hiatal hernia    • Hypertension    • Inguinal hernia     right    • Lower extremity neuropathy    • Pulmonary embolus (HCC)    • Sepsis (HCC)    • Syncope    • Weight loss     50 lb        Past Surgical History:  History reviewed. No pertinent surgical history.    Family History:   family history includes Aortic aneurysm in his father; No Known Problems in his mother.   Otherwise pertinent FHx was reviewed and not pertinent to current issue.    Social History:    reports that he quit smoking about 13 years ago. His smoking use included cigarettes. He has quit using smokeless tobacco. He reports that he does not drink alcohol and does not use drugs.    Medications:     Current Outpatient Medications:   •  cyanocobalamin 1000 MCG/ML injection, inject 1 milliliter by intramuscular route  every 2 weeks  - dispense with syringes #10, Disp: , Rfl:   •  nitroglycerin (NITROLINGUAL) 0.4 MG/SPRAY spray, Place 1 spray under the tongue Every 5 (Five) Minutes As Needed for Chest Pain., Disp: , Rfl:   •  rosuvastatin (CRESTOR) 20 MG tablet, Take 1 tablet by mouth Daily., Disp: , Rfl:   •  oxyCODONE-acetaminophen (Percocet) 5-325 MG per tablet, Take 1 tablet by mouth Every 6 (Six) Hours As Needed., Disp: 24 tablet, Rfl: 0    Allergies:   Allergies   Allergen Reactions   • Morphine And Related Anaphylaxis   • Other      Ace inhibitors         IPSS Questionnaire (AUA-7):  Over the past month…    1)  Incomplete Emptying  How often have you had a sensation of not emptying your bladder?  5 - Almost always   2)  Frequency  How often have you had to urinate less than every two hours? 0 - Not at all   3)  Intermittency  How often have you found you stopped and started again several times when you urinated?  0 - Not at all   4) Urgency  How often have you found it difficult to postpone urination?  5 - Almost always   5) Weak Stream  How often have you had a weak urinary stream?  0 - Not at  "all   6) Straining  How often have you had to push or strain to begin urination?  0 - Not at all   7) Nocturia  How many times did you typically get up at night to urinate?  1 - 1 time   Total Score:  11   The International Prostate Symptom Score (IPSS) is used to screen, diagnose, track symptoms of benign prostatic hyperplasia (BPH).    0-7 pts (Mild Symptoms)  / 8-19 pts (Moderate) / 20-35 (Severe)    Quality of life due to urinary symptoms:  If you were to spend the rest of your life with your urinary condition the way it is now, how would you feel about that? 1-Pleased   Urine Leakage (Incontinence) 0-No Leakage       Objective     Physical Exam:   Vital Signs:   Vitals:    08/15/22 1233   Weight: 70.3 kg (155 lb)   Height: 167.6 cm (65.98\")     Body mass index is 25.03 kg/m².     Physical Exam  Vitals and nursing note reviewed.   Constitutional:       General: He is awake. He is not in acute distress.     Appearance: Normal appearance. He is well-developed.   HENT:      Head: Normocephalic and atraumatic.      Right Ear: External ear normal.      Left Ear: External ear normal.      Nose: Nose normal.   Eyes:      Conjunctiva/sclera: Conjunctivae normal.   Pulmonary:      Effort: Pulmonary effort is normal.   Abdominal:      General: There is no distension.      Palpations: Abdomen is soft. There is no mass.      Tenderness: There is no abdominal tenderness. There is no right CVA tenderness, left CVA tenderness, guarding or rebound.      Hernia: No hernia is present. There is no hernia in the left inguinal area or right inguinal area.   Genitourinary:     Pubic Area: No rash.       Penis: Normal.       Rectum: No mass or tenderness. Normal anal tone.      Comments: Unchanged exam from last visit  Musculoskeletal:      Cervical back: Normal range of motion.   Lymphadenopathy:      Lower Body: No right inguinal adenopathy. No left inguinal adenopathy.   Skin:     General: Skin is warm.   Neurological:      " General: No focal deficit present.      Mental Status: He is alert and oriented to person, place, and time.   Psychiatric:         Behavior: Behavior normal. Behavior is cooperative.         Labs:   Brief Urine Lab Results  (Last result in the past 365 days)      Color   Clarity   Blood   Leuk Est   Nitrite   Protein   CREAT   Urine HCG        08/08/22 1446 Yellow   Clear   Negative   Negative   Negative   Negative                      Lab Results   Component Value Date    GLUCOSE 93 03/07/2022    CALCIUM 9.6 03/07/2022     03/07/2022    K 4.3 03/07/2022    CO2 27.0 03/07/2022     03/07/2022    BUN 21 03/07/2022    CREATININE 1.06 03/07/2022    BCR 19.8 03/07/2022    ANIONGAP 8.0 03/07/2022       Lab Results   Component Value Date    WBC 5.96 03/07/2022    HGB 13.2 03/07/2022    HCT 39.5 03/07/2022    MCV 87.8 03/07/2022     03/07/2022       No results found for: PSA    Images:   US Scrotum & Testicles    Result Date: 8/9/2022    Moderate-sized right and left hydrocele.  This report was finalized on 8/9/2022 8:07 AM by Dr. Rex Moncada MD.          Measures:   Tobacco:   Tyree Mcneil  reports that he quit smoking about 13 years ago. His smoking use included cigarettes. He has quit using smokeless tobacco..      Urine Incontinence: Patient reports that he is not currently experiencing any symptoms of urinary incontinence.      Assessment / Plan      Assessment/Plan:   71 y.o. male who presented today for follow up of left inguinal hernia or hydrocele.  It is difficult to tell based on his exam.  After discussing his result from his ultrasound with radiology they have recommended a CT scan.  I will obtain a CT scan and have him follow up with Dr. Barrett in 2-3 weeks.     Diagnoses and all orders for this visit:    1. Left inguinal hernia (Primary)  -     CT Abdomen Pelvis With Contrast; Future           Follow Up:   Return in about 2 weeks (around 8/29/2022) for Recheck, Follow up with  images.    I spent approximately 30 minutes providing clinical care for this patient; including review of patient's chart and provider documentation, face to face time spent with patient in examination room (obtaining history, performing physical exam, discussing diagnosis and management options), placing orders, and completing patient documentation.     Lisa Cuba MD  [facility]

## 2022-08-31 ENCOUNTER — OFFICE VISIT (OUTPATIENT)
Dept: CARDIOLOGY | Facility: CLINIC | Age: 72
End: 2022-08-31

## 2022-08-31 VITALS
OXYGEN SATURATION: 98 % | WEIGHT: 154 LBS | DIASTOLIC BLOOD PRESSURE: 74 MMHG | HEART RATE: 57 BPM | SYSTOLIC BLOOD PRESSURE: 160 MMHG | BODY MASS INDEX: 25.66 KG/M2 | HEIGHT: 65 IN

## 2022-08-31 DIAGNOSIS — E78.2 MIXED HYPERLIPIDEMIA: ICD-10-CM

## 2022-08-31 DIAGNOSIS — I25.10 CORONARY ARTERY DISEASE INVOLVING NATIVE CORONARY ARTERY OF NATIVE HEART WITHOUT ANGINA PECTORIS: Primary | ICD-10-CM

## 2022-08-31 DIAGNOSIS — I79.8 OTHER DISORDERS OF ARTERIES, ARTERIOLES AND CAPILLARIES IN DISEASES CLASSIFIED ELSEWHERE: ICD-10-CM

## 2022-08-31 PROCEDURE — 99214 OFFICE O/P EST MOD 30 MIN: CPT | Performed by: INTERNAL MEDICINE

## 2022-08-31 NOTE — PROGRESS NOTES
[facility]  Office Progress Note  Tyree Mcneil  1950  [address] [place] [address]       Visit Date: 08/30/21    PCP: Jr Altamirano MD  [address] [facility] [place] [address]    IDENTIFICATION: A 72 y.o. male retired, from [place], [place].    PROBLEM LIST:   1. Coronary artery disease:  a. March 2004, GLO to LAD.  b. December 2005, multivessel coronary disease with CABG x4 per Sachin Camarena MD; LIMA to LAD; saphenous graft to sequential OM1 and posterolateral RCA; saphenous graft to PDA. Normal LVEF.  c. November 2010, MPS per [facility]: Normal perfusion, EF preserved.   d. Spring 2011, non-ST MI at [facility], perioperative during Ileostomy.  e. 5/16/16 echo: EF 55-60%, mild MR, mild TR, RVSP 20 mmHg  f. 2/22 Cardiolite abnl fixed inferobasilar defect EF 62%  2. Pulmonary embolus:  a. Discontinuation of Coumadin one year after initiation in 2012.   3. Hypertension.   4. Dyslipidemia:   a. May 2015: Total 118, triglyceride 81, HDL 39, LDL 63.   b. 5/8/19:  TG 86 HDL 36   c. 2022 179/43/68/123  5. Reformed nicotine addiction.   6. Anxiety.   7. Syncope.   8. Gastroesophageal reflux disease/history of hiatal hernia.  9. Right inguinal hernia.  10. Lower extremity neuropathy following chemotherapy.   11. Acute renal failure.  12. Sepsis.  13. A 50-pound weight loss.  14. Colorectal cancer, patient received chemo and radiation:  a. Colon resection, December 2010.   b. January 2011, reversal of ileostomy with notation of extensive adhesions.   c. Patient refuses further colorectal evaluation at time of ileostomy.      CC:   Chief Complaint   Patient presents with   • Coronary artery disease involving native coronary artery of       Allergies  Allergies   Allergen Reactions   • Morphine And Related Anaphylaxis   • Other      Ace inhibitors         Current Medications    Current Outpatient Medications:   •  cyanocobalamin 1000 MCG/ML injection, inject 1 milliliter by  "intramuscular route  every 2 weeks  - dispense with syringes #10, Disp: , Rfl:   •  nitroglycerin (NITROLINGUAL) 0.4 MG/SPRAY spray, Place 1 spray under the tongue Every 5 (Five) Minutes As Needed for Chest Pain., Disp: , Rfl:   •  rosuvastatin (CRESTOR) 20 MG tablet, Take 1 tablet by mouth Daily., Disp: , Rfl:       History of Present Illness   Tyree Mcneil is a 72 y.o. year old male here for follow up.    Pt denies any chest pain, dyspnea, dyspnea on exertion, orthopnea, PND, palpitations, lower extremity edema, or claudication.  He did agree to restart cholesterol medication with recent serologies per his PCP  Is currently having evaluation regarding a hydrocele    OBJECTIVE:  Vitals:    08/31/22 1054   BP: 160/74   BP Location: Right arm   Patient Position: Sitting   Pulse: 57   SpO2: 98%   Weight: 69.9 kg (154 lb)   Height: 165.1 cm (65\")     Body mass index is 25.63 kg/m².    Constitutional:       Appearance: Healthy appearance. Not in distress.   Neck:      Vascular: No JVR. JVD normal.   Pulmonary:      Effort: Pulmonary effort is normal.      Breath sounds: Normal breath sounds. No wheezing. No rhonchi. No rales.   Chest:      Chest wall: Not tender to palpatation.   Cardiovascular:      PMI at left midclavicular line. Normal rate. Regular rhythm. Normal S1. Normal S2.      Murmurs: There is no murmur.      No gallop. No click. No rub.   Pulses:     Intact distal pulses.   Edema:     Peripheral edema absent.   Abdominal:      General: Bowel sounds are normal.      Palpations: Abdomen is soft.      Tenderness: There is no abdominal tenderness.   Musculoskeletal: Normal range of motion.         General: No tenderness. Skin:     General: Skin is warm and dry.   Neurological:      General: No focal deficit present.      Mental Status: Alert and oriented to person, place and time.         Diagnostic Data:  Procedures      ASSESSMENT:   Diagnosis Plan   1. Coronary artery disease involving native coronary artery " of native heart without angina pectoris     2. Mixed hyperlipidemia         PLAN:  CAD post remote surgical revascularization patient elects no medication.  Low risk ischemic test within the last year    Dyslipidemia restarted statin therapy for follow-up serologies later this year per PCP    Patient is asking to have a carotid duplex which we will forward him in [place] for geographic convenience      Reggie Garcia MD, FACC

## 2022-09-02 ENCOUNTER — HOSPITAL ENCOUNTER (OUTPATIENT)
Dept: CT IMAGING | Facility: HOSPITAL | Age: 72
Discharge: HOME | End: 2022-09-02
Admitting: UROLOGY

## 2022-09-02 DIAGNOSIS — K40.90 LEFT INGUINAL HERNIA: ICD-10-CM

## 2022-09-02 LAB — CREAT BLDA-MCNC: 1 MG/DL (ref 0.6–1.3)

## 2022-09-02 PROCEDURE — 25010000002 IOPAMIDOL 61 % SOLUTION: Performed by: UROLOGY

## 2022-09-02 PROCEDURE — 74177 CT ABD & PELVIS W/CONTRAST: CPT

## 2022-09-02 PROCEDURE — 74177 CT ABD & PELVIS W/CONTRAST: CPT | Performed by: RADIOLOGY

## 2022-09-02 PROCEDURE — 82565 ASSAY OF CREATININE: CPT

## 2022-09-02 RX ADMIN — IOPAMIDOL 80 ML: 612 INJECTION, SOLUTION INTRAVENOUS at 08:43

## 2022-09-08 ENCOUNTER — OFFICE VISIT (OUTPATIENT)
Dept: UROLOGY | Facility: CLINIC | Age: 72
End: 2022-09-08

## 2022-09-08 VITALS — BODY MASS INDEX: 25.66 KG/M2 | HEIGHT: 65 IN | WEIGHT: 154 LBS

## 2022-09-08 DIAGNOSIS — C19 COLORECTAL CANCER: Primary | ICD-10-CM

## 2022-09-08 PROCEDURE — 99213 OFFICE O/P EST LOW 20 MIN: CPT | Performed by: UROLOGY

## 2022-09-12 PROBLEM — N43.0 ENCYSTED HYDROCELE: Status: ACTIVE | Noted: 2022-09-12

## 2022-09-29 ENCOUNTER — HOSPITAL ENCOUNTER (OUTPATIENT)
Dept: CARDIOLOGY | Facility: HOSPITAL | Age: 72
Discharge: HOME | End: 2022-09-29
Admitting: INTERNAL MEDICINE

## 2022-09-29 DIAGNOSIS — I79.8 OTHER DISORDERS OF ARTERIES, ARTERIOLES AND CAPILLARIES IN DISEASES CLASSIFIED ELSEWHERE: ICD-10-CM

## 2022-09-29 DIAGNOSIS — I25.10 CORONARY ARTERY DISEASE INVOLVING NATIVE CORONARY ARTERY OF NATIVE HEART WITHOUT ANGINA PECTORIS: ICD-10-CM

## 2022-09-29 DIAGNOSIS — E78.2 MIXED HYPERLIPIDEMIA: ICD-10-CM

## 2022-09-29 PROCEDURE — 93880 EXTRACRANIAL BILAT STUDY: CPT | Performed by: RADIOLOGY

## 2022-09-29 PROCEDURE — 93880 EXTRACRANIAL BILAT STUDY: CPT

## 2022-10-05 ENCOUNTER — TELEPHONE (OUTPATIENT)
Dept: CARDIOLOGY | Facility: CLINIC | Age: 72
End: 2022-10-05

## 2022-10-05 NOTE — TELEPHONE ENCOUNTER
----- Message from Reggie Garcia MD sent at 10/4/2022  5:41 PM EDT -----  CUS benign    LM with detailed results per verbal release. Call back with further questions.

## 2023-03-10 ENCOUNTER — TRANSCRIBE ORDERS (OUTPATIENT)
Dept: ADMINISTRATIVE | Facility: HOSPITAL | Age: 73
End: 2023-03-10
Payer: MEDICARE

## 2023-03-10 DIAGNOSIS — R42 DIZZINESS: Primary | ICD-10-CM

## 2023-03-10 DIAGNOSIS — H90.41 SENSORINEURAL HEARING LOSS, UNILATERAL, RIGHT EAR, WITH UNRESTRICTED HEARING ON THE CONTRALATERAL SIDE: ICD-10-CM

## 2023-04-10 ENCOUNTER — HOSPITAL ENCOUNTER (OUTPATIENT)
Dept: MRI IMAGING | Facility: HOSPITAL | Age: 73
Discharge: HOME | End: 2023-04-10
Payer: MEDICARE

## 2023-04-10 DIAGNOSIS — R42 DIZZINESS: ICD-10-CM

## 2023-04-10 DIAGNOSIS — H90.41 SENSORINEURAL HEARING LOSS, UNILATERAL, RIGHT EAR, WITH UNRESTRICTED HEARING ON THE CONTRALATERAL SIDE: ICD-10-CM

## 2023-04-10 LAB — CREAT BLDA-MCNC: 0.9 MG/DL (ref 0.6–1.3)

## 2023-04-10 PROCEDURE — 82565 ASSAY OF CREATININE: CPT

## 2023-04-10 PROCEDURE — 0 GADOBENATE DIMEGLUMINE 529 MG/ML SOLUTION: Performed by: NURSE PRACTITIONER

## 2023-04-10 PROCEDURE — A9577 INJ MULTIHANCE: HCPCS | Performed by: NURSE PRACTITIONER

## 2023-04-10 PROCEDURE — 70553 MRI BRAIN STEM W/O & W/DYE: CPT | Performed by: RADIOLOGY

## 2023-04-10 PROCEDURE — 70553 MRI BRAIN STEM W/O & W/DYE: CPT

## 2023-04-10 RX ADMIN — GADOBENATE DIMEGLUMINE 14 ML: 529 INJECTION, SOLUTION INTRAVENOUS at 11:08

## 2023-10-30 ENCOUNTER — OFFICE VISIT (OUTPATIENT)
Dept: CARDIOLOGY | Facility: CLINIC | Age: 73
End: 2023-10-30
Payer: MEDICARE

## 2023-10-30 VITALS
HEART RATE: 61 BPM | WEIGHT: 155.4 LBS | HEIGHT: 68 IN | OXYGEN SATURATION: 99 % | SYSTOLIC BLOOD PRESSURE: 140 MMHG | DIASTOLIC BLOOD PRESSURE: 76 MMHG | BODY MASS INDEX: 23.55 KG/M2

## 2023-10-30 DIAGNOSIS — I25.10 CORONARY ARTERY DISEASE INVOLVING NATIVE CORONARY ARTERY OF NATIVE HEART WITHOUT ANGINA PECTORIS: Primary | ICD-10-CM

## 2023-10-30 DIAGNOSIS — E78.2 MIXED HYPERLIPIDEMIA: ICD-10-CM

## 2023-10-30 PROCEDURE — 3077F SYST BP >= 140 MM HG: CPT | Performed by: INTERNAL MEDICINE

## 2023-10-30 PROCEDURE — 99213 OFFICE O/P EST LOW 20 MIN: CPT | Performed by: INTERNAL MEDICINE

## 2023-10-30 PROCEDURE — 3078F DIAST BP <80 MM HG: CPT | Performed by: INTERNAL MEDICINE

## 2023-10-30 RX ORDER — ERGOCALCIFEROL 1.25 MG/1
1 CAPSULE ORAL WEEKLY
COMMUNITY
Start: 2023-10-20

## 2023-10-30 NOTE — PROGRESS NOTES
[facility] Cardiology  Office Progress Note  Tyree Mcneil  1950  [address] [place] [address]       Visit Date: 08/30/21    PCP: Jr Altamirano MD  [address] [facility] [address] [facility] [place] [address]    IDENTIFICATION: A 73 y.o. male retired, from [place], [place].    PROBLEM LIST:   Coronary artery disease:  March 2004, GLO to LAD.  December 2005, multivessel coronary disease with CABG x4 per Sachin Camarena MD; LIMA to LAD; saphenous graft to sequential OM1 and posterolateral RCA; saphenous graft to PDA. Normal LVEF.  November 2010, MPS per [facility]: Normal perfusion, EF preserved.   Spring 2011, non-ST MI at [place], perioperative during Ileostomy.  5/16/16 echo: EF 55-60%, mild MR, mild TR, RVSP 20 mmHg  2/22 Cardiolite abnl fixed inferobasilar defect EF 62%  Pulmonary embolus:  Discontinuation of Coumadin one year after initiation in 2012.   Hypertension.   Dyslipidemia:   2022 179/43/68/123  Reformed nicotine addiction.   Gastroesophageal reflux disease/history of hiatal hernia.  Lower extremity neuropathy following chemotherapy.   Sepsis/Acute renal failure 2010 resolved  Colorectal cancer, patient received chemo and radiation:  Colon resection, December 2010.   January 2011, reversal of ileostomy with notation of extensive adhesions.   Patient refuses further colorectal evaluation at time of ileostomy.      CC:   Chief Complaint   Patient presents with    Coronary Artery Disease       Allergies  Allergies   Allergen Reactions    Morphine And Related Anaphylaxis    Other      Ace inhibitors         Current Medications    Current Outpatient Medications:     cyanocobalamin 1000 MCG/ML injection, inject 1 milliliter by intramuscular route  every 2 weeks  - dispense with syringes #10, Disp: , Rfl:     nitroglycerin (NITROLINGUAL) 0.4 MG/SPRAY spray, Place 1 spray under the tongue Every 5 (Five) Minutes As Needed for Chest Pain., Disp: , Rfl:     rosuvastatin (CRESTOR) 20 MG tablet, Take 1 tablet by mouth  "Daily., Disp: , Rfl:     vitamin D (ERGOCALCIFEROL) 1.25 MG (55490 UT) capsule capsule, Take 1 capsule by mouth 1 (One) Time Per Week., Disp: , Rfl:       History of Present Illness   Tyree Mcneil is a 73 y.o. year old male here for follow up.  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      OBJECTIVE:  Vitals:    10/30/23 1152   BP: 140/76   BP Location: Right arm   Patient Position: Sitting   Pulse: 61   SpO2: 99%   Weight: 70.5 kg (155 lb 6.4 oz)   Height: 172.7 cm (68\")       Body mass index is 23.63 kg/m².    Constitutional:       Appearance: Healthy appearance. Not in distress.   Neck:      Vascular: No JVR. JVD normal.   Pulmonary:      Effort: Pulmonary effort is normal.      Breath sounds: Normal breath sounds. No wheezing. No rhonchi. No rales.   Chest:      Chest wall: Not tender to palpatation.   Cardiovascular:      PMI at left midclavicular line. Normal rate. Regular rhythm. Normal S1. Normal S2.       Murmurs: There is no murmur.      No gallop.  No click. No rub.   Pulses:     Intact distal pulses.   Edema:     Peripheral edema absent.   Abdominal:      General: Bowel sounds are normal.      Palpations: Abdomen is soft.      Tenderness: There is no abdominal tenderness.   Musculoskeletal: Normal range of motion.         General: No tenderness. Skin:     General: Skin is warm and dry.   Neurological:      General: No focal deficit present.      Mental Status: Alert and oriented to person, place and time.         Diagnostic Data:  Procedures      ASSESSMENT:   Diagnosis Plan   1. Coronary artery disease involving native coronary artery of native heart without angina pectoris        2. Mixed hyperlipidemia              PLAN:  CAD post remote surgical revascularization patient elects no medication.  Low risk ischemic test within the last year    Dyslipidemia compliant with statin therapy           Reggie Garcia MD, FACC  "

## 2025-03-03 ENCOUNTER — OFFICE VISIT (OUTPATIENT)
Dept: CARDIOLOGY | Facility: CLINIC | Age: 75
End: 2025-03-03
Payer: MEDICARE

## 2025-03-03 VITALS
DIASTOLIC BLOOD PRESSURE: 80 MMHG | HEART RATE: 55 BPM | SYSTOLIC BLOOD PRESSURE: 134 MMHG | BODY MASS INDEX: 22.96 KG/M2 | OXYGEN SATURATION: 99 % | HEIGHT: 69 IN | WEIGHT: 155 LBS

## 2025-03-03 DIAGNOSIS — E78.2 MIXED HYPERLIPIDEMIA: ICD-10-CM

## 2025-03-03 DIAGNOSIS — I25.10 CORONARY ARTERY DISEASE INVOLVING NATIVE CORONARY ARTERY OF NATIVE HEART WITHOUT ANGINA PECTORIS: Primary | ICD-10-CM

## 2025-03-03 PROCEDURE — 3075F SYST BP GE 130 - 139MM HG: CPT | Performed by: INTERNAL MEDICINE

## 2025-03-03 PROCEDURE — 99213 OFFICE O/P EST LOW 20 MIN: CPT | Performed by: INTERNAL MEDICINE

## 2025-03-03 PROCEDURE — 3079F DIAST BP 80-89 MM HG: CPT | Performed by: INTERNAL MEDICINE

## 2025-03-03 RX ORDER — VALSARTAN 160 MG/1
160 TABLET ORAL DAILY
COMMUNITY
Start: 2025-01-23

## 2025-03-03 RX ORDER — NITROGLYCERIN 0.4 MG/1
TABLET SUBLINGUAL
Qty: 100 TABLET | Refills: 11 | Status: SHIPPED | OUTPATIENT
Start: 2025-03-03

## 2025-03-03 NOTE — PROGRESS NOTES
[facility] Cardiology  Office Progress Note  Tyree Mcneil  1950  [address] [place] [address]       Visit Date: 08/30/21    PCP: Jr Altamirano MD  [address] [address]    IDENTIFICATION: A 74 y.o. male retired, from [place], [place].    PROBLEM LIST:   Coronary artery disease:  March 2004, GLO to LAD.  December 2005, multivessel coronary disease with CABG x4 per Sachin Camarena MD; LIMA to LAD; saphenous graft to sequential OM1 and posterolateral RCA; saphenous graft to PDA. Normal LVEF.  November 2010, MPS per [facility]: Normal perfusion, EF preserved.   Spring 2011, non-ST MI at [place], perioperative during Ileostomy.  5/16/16 echo: EF 55-60%, mild MR, mild TR, RVSP 20 mmHg  2/22 Cardiolite abnl fixed inferobasilar defect EF 62%  Pulmonary embolus:  Discontinuation of Coumadin one year after initiation in 2012.   Hypertension.   Dyslipidemia:   2022 179/43/68/123  Reformed nicotine addiction.   Gastroesophageal reflux disease/history of hiatal hernia.  Lower extremity neuropathy following chemotherapy.   Sepsis/Acute renal failure 2010 resolved  Colorectal cancer, patient received chemo and radiation:  Colon resection, December 2010.   January 2011, reversal of ileostomy with notation of extensive adhesions.   Patient refuses further colorectal evaluation at time of ileostomy.      CC:   Chief Complaint   Patient presents with    Coronary Artery Disease     Coronary artery disease involving native coronary artery of native heart without angina pectoris    Chest Pain    Dizziness    Cough       Allergies  Allergies   Allergen Reactions    Morphine And Codeine Anaphylaxis    Other      Ace inhibitors         Current Medications    Current Outpatient Medications:     cyanocobalamin 1000 MCG/ML injection, inject 1 milliliter by intramuscular route  every 2 weeks  - dispense with syringes #10, Disp: , Rfl:     rosuvastatin (CRESTOR) 20 MG tablet, Take 1 tablet by mouth Daily., Disp:  ", Rfl:     valsartan (DIOVAN) 160 MG tablet, Take 1 tablet by mouth Daily., Disp: , Rfl:     vitamin D (ERGOCALCIFEROL) 1.25 MG (36673 UT) capsule capsule, Take 1 capsule by mouth 1 (One) Time Per Week., Disp: , Rfl:     nitroglycerin (NITROSTAT) 0.4 MG SL tablet, 1 under the tongue as needed for angina, may repeat q5mins for up three doses, Disp: 100 tablet, Rfl: 11      History of Present Illness   Tyree Mcneil is a 74 y.o. year old male here for follow up.  Companied by his family member today.  She states that he has been compliant with his medications.      OBJECTIVE:  Vitals:    03/03/25 1421   BP: 134/80   BP Location: Right arm   Patient Position: Sitting   Pulse: 55   SpO2: 99%   Weight: 70.3 kg (155 lb)   Height: 175.3 cm (69\")         Body mass index is 22.89 kg/m².    Constitutional:       Appearance: Healthy appearance. Not in distress.   HENT:         Comments: Edentulous  Neck:      Vascular: No JVR. JVD normal.   Pulmonary:      Effort: Pulmonary effort is normal.      Breath sounds: Normal breath sounds. No wheezing. No rhonchi. No rales.   Chest:      Chest wall: Not tender to palpatation.   Cardiovascular:      PMI at left midclavicular line. Normal rate. Regular rhythm. Normal S1. Normal S2.       Murmurs: There is no murmur.      No gallop.  No click. No rub.   Pulses:     Intact distal pulses.   Edema:     Peripheral edema absent.   Abdominal:      General: Bowel sounds are normal.      Palpations: Abdomen is soft.      Tenderness: There is no abdominal tenderness.   Musculoskeletal: Normal range of motion.         General: No tenderness. Skin:     General: Skin is warm and dry.   Neurological:      General: No focal deficit present.      Mental Status: Alert and oriented to person, place and time.         Diagnostic Data:  Procedures      ASSESSMENT:   Diagnosis Plan   1. Coronary artery disease involving native coronary artery of native heart without angina pectoris        2. Mixed " hyperlipidemia                PLAN:  CAD post remote surgical revascularization patient elects no medication.  Low risk ischemic test 2022    Dyslipidemia compliant with statin therapy           Reggie Garcia MD, FACC